# Patient Record
Sex: MALE
[De-identification: names, ages, dates, MRNs, and addresses within clinical notes are randomized per-mention and may not be internally consistent; named-entity substitution may affect disease eponyms.]

---

## 2020-06-27 ENCOUNTER — HOSPITAL ENCOUNTER (EMERGENCY)
Dept: HOSPITAL 88 - ER | Age: 50
Discharge: HOME | End: 2020-06-27
Payer: SELF-PAY

## 2020-06-27 VITALS — WEIGHT: 170 LBS | BODY MASS INDEX: 27.32 KG/M2 | HEIGHT: 66 IN

## 2020-06-27 DIAGNOSIS — R50.9: Primary | ICD-10-CM

## 2020-06-27 DIAGNOSIS — Z03.818: ICD-10-CM

## 2020-06-27 DIAGNOSIS — M79.10: ICD-10-CM

## 2020-06-27 DIAGNOSIS — B34.9: ICD-10-CM

## 2020-06-27 PROCEDURE — 99282 EMERGENCY DEPT VISIT SF MDM: CPT

## 2020-06-27 NOTE — EMERGENCY DEPARTMENT NOTE
History of Present Illnes


History of Present Illness


History of Present Illness


This is a 50 year old  male  .


History limited by:  language barrier


 Required:  Yes


Onset (how long ago):  day(s)


Severity:  mild


Onset quality:  gradual


Duration (how long):  day(s) (1)


Timing of current episode:  constant


Progression:  unchanged


Chronicity:  new


Associated symptoms:  Reports fever/chills, Reports nausea/vomiting


Treatments prior to arrival:  none





Past Medical/Family History


Physician Review


I have reviewed the patient's past medical and family history.  Any updates have

been documented here.





Review of Systems


Review of Systems


Constitutional:  Reports fever, Reports weakness





Physical Exam


Related Data


Allergies:  


Coded Allergies:  


     No Known Allergies (Unverified , 6/27/20)


Vital signs reviewed:  Yes





Physical Exam


CONSTITUTIONAL





Constitutional:  Present well-developed, Present well-nourished


HENT


HENT:  Present normocephalic, Present atraumatic, Present oropharynx 

clear/moist, Present nose normal


HENT L/R:  Present left ext ear normal, Present right ext ear normal


EYES





Eyes:  Reports PERRL, Reports conjunctivae normal


NECK


Neck:  Present ROM normal


PULMONARY


Pulmonary:  Present effort normal, Present breath sounds normal


CARDIOVASCULAR





Cardiovascular:  Present regular rhythm, Present heart sounds normal, Present 

capillary refill normal, Present normal rate


GASTROINTESTINAL





Abdominal:  Present soft, Present nontender, Present bowel sounds normal


GENITOURINARY





Genitourinary:  Present exam deferred


SKIN


Skin:  Present warm, Present dry


MUSCULOSKELETAL





Musculoskeletal:  Present ROM normal


NEUROLOGICAL





Neurological:  Present alert, Present oriented x 3, Present no gross motor or 

sensory deficits


PSYCHOLOGICAL


Psychological:  Present mood/affect normal, Present judgement normal





Assessment & Plan


Medical Decision Making


MDM


50 yom with fever and myalgias. (+) contact with family members with similiar 

symptoms. COVID-19 highly suspected but testing deferred secondary to stable 

vital signs and high oxygen saturation on RA. Patient to be given Rx 

Azithromycin and albuterol. Patient to be given resources for outpatient testing

centers





Assessment & Plan


Final Impression:  


(1) Viral syndrome


Depart Disposition:  HOME, SELF-CARE











TO LANE                 Jun 27, 2020 15:11

## 2021-12-14 ENCOUNTER — HOSPITAL ENCOUNTER (OUTPATIENT)
Dept: HOSPITAL 88 - ENDO | Age: 51
Discharge: HOME | End: 2021-12-14
Attending: INTERNAL MEDICINE
Payer: COMMERCIAL

## 2021-12-14 VITALS — SYSTOLIC BLOOD PRESSURE: 136 MMHG | DIASTOLIC BLOOD PRESSURE: 80 MMHG

## 2021-12-14 DIAGNOSIS — K44.9: ICD-10-CM

## 2021-12-14 DIAGNOSIS — K29.50: Primary | ICD-10-CM

## 2021-12-14 DIAGNOSIS — Z01.812: ICD-10-CM

## 2021-12-14 DIAGNOSIS — F45.8: ICD-10-CM

## 2021-12-14 DIAGNOSIS — Z01.810: ICD-10-CM

## 2021-12-14 DIAGNOSIS — K21.00: ICD-10-CM

## 2021-12-14 DIAGNOSIS — K31.7: ICD-10-CM

## 2021-12-14 DIAGNOSIS — Z20.822: ICD-10-CM

## 2021-12-14 PROCEDURE — 43239 EGD BIOPSY SINGLE/MULTIPLE: CPT

## 2021-12-14 PROCEDURE — 43450 DILATE ESOPHAGUS 1/MULT PASS: CPT

## 2021-12-14 PROCEDURE — 93005 ELECTROCARDIOGRAM TRACING: CPT
